# Patient Record
Sex: FEMALE | Race: WHITE | Employment: STUDENT | URBAN - METROPOLITAN AREA
[De-identification: names, ages, dates, MRNs, and addresses within clinical notes are randomized per-mention and may not be internally consistent; named-entity substitution may affect disease eponyms.]

---
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
## 2023-05-17 ENCOUNTER — TELEPHONE (OUTPATIENT)
Dept: OBGYN CLINIC | Facility: CLINIC | Age: 18
End: 2023-05-17

## 2023-05-17 NOTE — TELEPHONE ENCOUNTER
Please verify IUD removal only benefits & verification, scheduled 6/5 to have removed before expiration, body & mentally not doing well on it

## 2023-06-05 ENCOUNTER — PROCEDURE VISIT (OUTPATIENT)
Dept: OBGYN CLINIC | Facility: CLINIC | Age: 18
End: 2023-06-05
Payer: COMMERCIAL

## 2023-06-05 VITALS
WEIGHT: 107.4 LBS | BODY MASS INDEX: 19.77 KG/M2 | HEIGHT: 62 IN | SYSTOLIC BLOOD PRESSURE: 98 MMHG | DIASTOLIC BLOOD PRESSURE: 58 MMHG

## 2023-06-05 DIAGNOSIS — Z30.432 ENCOUNTER FOR IUD REMOVAL: Primary | ICD-10-CM

## 2023-06-05 DIAGNOSIS — Z30.011 ENCOUNTER FOR PRESCRIPTION OF ORAL CONTRACEPTIVES: ICD-10-CM

## 2023-06-05 PROCEDURE — 58301 REMOVE INTRAUTERINE DEVICE: CPT | Performed by: NURSE PRACTITIONER

## 2023-06-05 RX ORDER — NORGESTIMATE AND ETHINYL ESTRADIOL 0.25-0.035
1 KIT ORAL DAILY
Qty: 84 TABLET | Refills: 0 | Status: SHIPPED | OUTPATIENT
Start: 2023-06-05

## 2023-06-05 NOTE — PROGRESS NOTES
"Assessment/Plan:  IUD removed in its entirety and visualized by patient  Use back up method of contraception immediately with sex  Aware of possible irregular menses post removal   Birth control pill sent to pharmacy to start today  Irreg bleeding may occur for first 3 months  F/u 3 months for Annual gyn        Diagnoses and all orders for this visit:    Encounter for IUD removal    Encounter for prescription of oral contraceptives  -     norgestimate-ethinyl estradiol (Rosi) 0 25-35 MG-MCG per tablet; Take 1 tablet by mouth daily          Subjective:      Patient ID: Madisyn Morel is a 21 y o  female  Here for removal Mirena IUD inserted 3/2022 She states she is mentally not doing well hormonal issues cramping mood swings US 5/2022 normal with IUD in proper position She went to ER 5/2022 with heavy menses dizzy/weak passed clots seen in ER again 6/5/2022 N/V vaginal bleeding Has tried OCP in past fr management menorr  SA w/ women       The following portions of the patient's history were reviewed and updated as appropriate: allergies, current medications, past family history, past medical history, past social history, past surgical history and problem list     Review of Systems   Genitourinary: Positive for pelvic pain  Negative for menstrual problem  Neurological: Negative for headaches  Psychiatric/Behavioral: Positive for dysphoric mood  The patient is not nervous/anxious  Objective:      BP 98/58   Ht 5' 1 75\" (1 568 m)   Wt 48 7 kg (107 lb 6 4 oz)   Breastfeeding No   BMI 19 80 kg/m²          Physical Exam      Iud removal    Date/Time: 6/5/2023 3:00 PM    Performed by: JAE Ni  Authorized by: JAE Ni  Universal Protocol:  Consent: Verbal consent obtained    Risks and benefits: risks, benefits and alternatives were discussed  Consent given by: patient  Patient understanding: patient states understanding of the procedure being performed  Patient identity confirmed: " verbally with patient      Procedure:     Removed with no complications: yes    Comments:      IUD removed in its entirety and visualized by patient  Use back up method of contraception immediately with sex   Aware of possible irregular menses post removal   Birth control pill sent to pharmacy to start today  Irreg bleeding may occur for first 3 months  F/u 3 months for Annual gyn

## 2023-06-05 NOTE — PATIENT INSTRUCTIONS
IUD removed in its entirety and visualized by patient  Use back up method of contraception immediately with sex   Aware of possible irregular menses post removal   Birth control pill sent to pharmacy to start today  Irreg bleeding may occur for first 3 months  F/u 3 months for Annual gyn

## 2023-08-23 DIAGNOSIS — Z30.011 ENCOUNTER FOR PRESCRIPTION OF ORAL CONTRACEPTIVES: ICD-10-CM

## 2023-08-27 RX ORDER — NORGESTIMATE AND ETHINYL ESTRADIOL 0.25-0.035
1 KIT ORAL DAILY
Qty: 84 TABLET | Refills: 0 | Status: SHIPPED | OUTPATIENT
Start: 2023-08-27

## 2023-09-26 ENCOUNTER — ANNUAL EXAM (OUTPATIENT)
Dept: OBGYN CLINIC | Facility: CLINIC | Age: 18
End: 2023-09-26
Payer: COMMERCIAL

## 2023-09-26 VITALS
DIASTOLIC BLOOD PRESSURE: 66 MMHG | WEIGHT: 113.2 LBS | SYSTOLIC BLOOD PRESSURE: 98 MMHG | HEIGHT: 62 IN | BODY MASS INDEX: 20.83 KG/M2

## 2023-09-26 DIAGNOSIS — Z30.011 ENCOUNTER FOR PRESCRIPTION OF ORAL CONTRACEPTIVES: ICD-10-CM

## 2023-09-26 DIAGNOSIS — Z01.419 GYNECOLOGIC EXAM NORMAL: Primary | ICD-10-CM

## 2023-09-26 PROBLEM — G43.109 MIGRAINE WITH TYPICAL AURA: Status: ACTIVE | Noted: 2018-06-14

## 2023-09-26 PROBLEM — R06.83 SNORING: Status: ACTIVE | Noted: 2019-03-01

## 2023-09-26 PROCEDURE — 99395 PREV VISIT EST AGE 18-39: CPT | Performed by: PHYSICIAN ASSISTANT

## 2023-09-26 RX ORDER — NORGESTIMATE AND ETHINYL ESTRADIOL 0.25-0.035
1 KIT ORAL DAILY
Qty: 84 TABLET | Refills: 3 | Status: SHIPPED | OUTPATIENT
Start: 2023-09-26

## 2023-09-26 NOTE — ASSESSMENT & PLAN NOTE
Pap guidelines reviewed. Will plan to start pap smears at age 22. Continue Monty OCP, script renewed to pharmacy. Return to office for annual or as needed.

## 2023-09-26 NOTE — LETTER
September 26, 2023     Patient: Liyah Zamarripa  YOB: 2005  Date of Visit: 9/26/2023      To Whom it May Concern:    Cyrus Hernandez is under my professional care. Suzette was seen in my office on 9/26/2023. Suzette had Mirena IUD removed 6/5/2023. Pelvic pain and bleeding issues resolved once IUD was removed. Was switched to [facility] 0.25-35 mg-mcg oral birth control pill and has not had any issues. If you have any questions or concerns, please don't hesitate to call.          Sincerely,          Cata Rahman PA-C

## 2023-09-26 NOTE — PROGRESS NOTES
Assessment/Plan   Problem List Items Addressed This Visit        Other    Gynecologic exam normal - Primary     Pap guidelines reviewed. Will plan to start pap smears at age 22. Continue Monty OCP, script renewed to pharmacy. Return to office for annual or as needed. Other Visit Diagnoses     Encounter for prescription of oral contraceptives        Relevant Medications    norgestimate-ethinyl estradiol (Monty) 0.25-35 MG-MCG per tablet          Subjective:     Patient ID: Sheng Helms is a 19 y.o. y.o. female. HPI  20 yo seen for annual exam. Currently on Monty OCP after having Mirena IUD removed in 2023. Reports pelvic pain and heavy bleeding has resolved. Had some increased fatigue when first starting Monty that has improved. Denies any history of headaches with dehydration, denies migraines, no aura. Denies bowel or bladder issues. Declines STD testing. Last pap: N/A. The following portions of the patient's history were reviewed and updated as appropriate:   She  has a past medical history of Headache, migraine and Seasonal allergies. She   Patient Active Problem List    Diagnosis Date Noted   • Gynecologic exam normal 2023   • Pelvic pain 2022   • Menorrhagia with irregular cycle 2022   • Snoring 2019     She  has no past surgical history on file. Her family history includes Atrial fibrillation in her father. She  reports that she has never smoked. She has never used smokeless tobacco. She reports that she does not drink alcohol and does not use drugs. Current Outpatient Medications   Medication Sig Dispense Refill   • norgestimate-ethinyl estradiol (Monty) 0.25-35 MG-MCG per tablet Take 1 tablet by mouth daily 84 tablet 3     No current facility-administered medications for this visit. She has No Known Allergies. .    Menstrual History:  OB History        0    Para   0    Term   0       0    AB   0    Living   0       SAB   0    IAB   0 Ectopic   0    Multiple   0    Live Births   0           Obstetric Comments   Menarche: Age 13               Patient's last menstrual period was 09/22/2023 (exact date). Review of Systems   Constitutional: Negative for fatigue, fever and unexpected weight change. HENT: Negative for dental problem and sinus pressure. Eyes: Negative for visual disturbance. Respiratory: Negative for cough, shortness of breath and wheezing. Cardiovascular: Negative for chest pain. Gastrointestinal: Negative for abdominal pain, blood in stool, constipation, diarrhea, nausea and vomiting. Endocrine: Negative for polydipsia. Genitourinary: Negative for difficulty urinating, dyspareunia, dysuria, frequency, hematuria, pelvic pain and urgency. Musculoskeletal: Negative for arthralgias and back pain. Neurological: Negative for dizziness, seizures, light-headedness and headaches. Psychiatric/Behavioral: Negative for suicidal ideas. The patient is not nervous/anxious. Objective:  Vitals:    09/26/23 1254   BP: 98/66   BP Location: Left arm   Patient Position: Sitting   Cuff Size: Standard   Weight: 51.3 kg (113 lb 3.2 oz)   Height: 5' 2" (1.575 m)      Physical Exam  Constitutional:       Appearance: Normal appearance. She is well-developed. Genitourinary:      Vulva and bladder normal.      No lesions in the vagina. Right Labia: No rash, tenderness, lesions or skin changes. Left Labia: No tenderness, lesions, skin changes or rash. No labial fusion noted. No inguinal adenopathy present in the right or left side. No vaginal discharge, erythema, tenderness or bleeding. Right Adnexa: not tender, not full and no mass present. Left Adnexa: not tender, not full and no mass present. No cervical motion tenderness, discharge or lesion. Uterus is not enlarged, tender or irregular. No uterine mass detected. No urethral prolapse, tenderness or mass present. Bladder is not tender. Breasts:     Breasts are symmetrical.      Right: No swelling, bleeding, inverted nipple, mass, nipple discharge, skin change or tenderness. Left: No swelling, bleeding, inverted nipple, mass, nipple discharge, skin change or tenderness. HENT:      Head: Normocephalic and atraumatic. Neck:      Thyroid: No thyromegaly. Cardiovascular:      Rate and Rhythm: Normal rate and regular rhythm. Heart sounds: Normal heart sounds. No murmur heard. No friction rub. No gallop. Pulmonary:      Effort: Pulmonary effort is normal. No respiratory distress. Breath sounds: Normal breath sounds. No wheezing. Abdominal:      General: There is no distension. Palpations: Abdomen is soft. There is no mass. Tenderness: There is no abdominal tenderness. There is no guarding or rebound. Hernia: No hernia is present. Lymphadenopathy:      Cervical: No cervical adenopathy. Upper Body:      Right upper body: No supraclavicular, axillary or pectoral adenopathy. Left upper body: No supraclavicular, axillary or pectoral adenopathy. Lower Body: No right inguinal adenopathy. No left inguinal adenopathy. Neurological:      Mental Status: She is alert and oriented to person, place, and time. Skin:     General: Skin is warm and dry.    Psychiatric:         Behavior: Behavior normal.